# Patient Record
(demographics unavailable — no encounter records)

---

## 2025-01-27 NOTE — DISCUSSION/SUMMARY
[de-identified] : Discussed at length the natural history of degenerative arthritis and reviewed non-operative and operative treatment. Prior non-operative treatment for more than 3 months by either myself or prior treating physicians, including NSAIDs, injection therapy, a structured exercise program or physiotherapy and weight management has not resolved the condition. Due to the pain and associated functional disability that impacts all appropriate activities of daily living, I recommend a right total knee replacement. A thorough discussion regarding the risks, benefits, convalescence and alternatives were reviewed. The risks can include but are not limited to anesthesia risk, bleeding wit possible transfusion, nerve injury, infection, revision surgery due to implant failure, bone fracture, deep vein thrombosis, cardiac failure, pneumonia, and respiratory distress. The patient's expectations were reviewed and compared to the surgeon's expectations. This allowed for a discussion regarding reasonable expectations for functional improvement, pain relief, and return to normal activities of daily living. Numerous questions were asked and answered to their satisfaction. The patient was involved in the decision-making process - we discussed implant choice and fixation along with all details of TKA. Models were used as an educational tool. She does have a history of psoriasis. We are awaiting her lab results.  Preop medical and rheumatology clearance.   Surgery is scheduled for February 4, 2025.  Regarding her left TKR, she is doing well.

## 2025-01-27 NOTE — ADDENDUM
[FreeTextEntry1] : This note was written by Dav Beverly on 01/27/2025 acting as scribe for Dr. Anand العراقي I, Dr. Anand Fonseca, have read and attest that all the information, medical decision making and discharge instructions within are true and accurate.    This note was written by Amando Brothers on 01/27/2025 acting as scribe for Dr. Anand العراقي I, Dr. Anand Fonseca, have read and attest that all the information, medical decision making and discharge instructions within are true and accurate.

## 2025-01-27 NOTE — HISTORY OF PRESENT ILLNESS
[de-identified] : THAIS NEUMANN 67 year old female presents for follow-up evaluation of pre-op for an upcoming right TKR. She has been working on getting medical and rheumatology clearance. pt states that the knee continues to be painful and limited in activity at the gym. She has no complaints about left knee at this time.

## 2025-01-27 NOTE — PHYSICAL EXAM
[de-identified] : General appearance: well-nourished and hydrated, pleasant, alert and oriented x 3, cooperative. HEENT: Normocephalic, EOM intact, Nasal septum midline, Oral cavity clear, External auditory canal clear. Cardiovascular: no apparent abnormalities, no lower leg edema, no varicosities, pedal pulses are palpable. Lymphatics Lymph nodes: none palpated, Lymphedema: not present. Neurologic: sensation is normal, no muscle weakness in upper or lower extremities, patella tendon reflexes intact. Dermatologic no apparent skin lesions, moist, warm, no rash. Spine: cervical spine appears normal and moves freely, thoracic spine appears normal and moves freely, lumbosacral spine appears normal and moves freely. Gait: nonantalgic.   Left Knee Inspection: no effusion or erythema. Wounds: well-healed midline incision  Alignment: normal. Palpation: no specific tenderness on palpation. ROM: Active (in degrees): 0-120. Ligamentous laxity (neg): negative ant. drawer test, negative post. drawer test, stable to varus stress test, stable to valgus stress test, Patellofemoral Alignment Test: Q angle-, normal. Muscle Test: good quad strength. Leg examination: calf is soft and non-tender.   Right Knee Inspection: mild effusion. Wounds: none. Alignment: normal  Palpation: no specific tenderness on palpation. ROM: Active (in degrees): 0-90 with pain and crepitus Ligamentous laxity (neg): all ligaments appear stable, negative ant. drawer test, negative post. drawer test, stable to varus stress test, stable to valgus stress test, negative Lachman's test, negative pivot shift test, Meniscal Test: negative McMurrays, negative Peng. Patellofemoral Alignment Test: Q angle-, normal. Muscle Test: good quad strength. Leg examination: calf is soft and non-tender.   [de-identified] : Left knee x-ray, AP, lateral, merchant view taken at the office today demonstrates a total knee replacement in satisfactory position and alignment. No evidence of loosening. The patella sits in a central position.  Right knee x-rays, standing AP/Lateral and Merchant films, and 45-degree PA standing view, taken at the office today shows diffuse tricompartmental degenerative arthritis, normal alignment, marginal osteophytes, Joint space narrowing especially on the lateral compartment, bone on bone sclerosis, patella at appropriate height and central position, patellofemoral joint space narrowing, peripheral osteophytes, Kellgren and Sharad grade 4.

## 2025-02-26 NOTE — PROCEDURE
[de-identified] : Observation on incision dry, clean, intact, well healed. Method staple removing kit. Suture site Cleaned with iodine swab after sutures are completely removed. Instructions Keep incision dry and clean, allowed to shower and pat site dry, do not rub dry, contact office is site becomes red, swollen, infected, or you develop a fever.

## 2025-02-26 NOTE — HISTORY OF PRESENT ILLNESS
[Clean/Dry/Intact] : clean, dry and intact [Healed] : healed [Swelling] : swollen [Neuro Intact] : an unremarkable neurological exam [Vascular Intact] : ~T peripheral vascular exam normal [Negative Carloz's] : maneuvers demonstrated a negative Carloz's sign [Doing Well] : is doing well [No Sign of Infection] : is showing no signs of infection [Adequate Pain Control] : has adequate pain control [Staples Removed] : staples were removed [Chills] : no chills [Constipation] : no constipation [Diarrhea] : no diarrhea [Dysuria] : no dysuria [Fever] : no fever [Nausea] : no nausea [Vomiting] : no vomiting [Erythema] : not erythematous [Discharge] : absent of discharge [Dehiscence] : not dehisced [de-identified] : Patient presents today for F/U S/P Right TKR done 3 weeks ago. Patient is doing well and undergoing P.T. with improvement. Takes pain meds and DVT prophylaxis. I went over post-op care and answered all questions. I also provided patient with surgical report for their records. [de-identified] : ROM 0-100 degrees

## 2025-03-26 NOTE — DISCUSSION/SUMMARY
[de-identified] : Patient is doing very well, happy and making good progress following their s/p Right TKR. I reviewed x-rays with them and compared to prior films. I have reassured them that their implants are functioning well. All questions answered, understanding verbalized.   She is encouraged to continue to stay active with a home exercise program. They can continue activities as tolerated.     I will see them back in 6-8 weeks with x-rays of both knees.

## 2025-03-26 NOTE — ADDENDUM
[FreeTextEntry1] : This note was written by Charlee Hernandez on 03/26/2025 acting as scribe for Dr. Anand Fonseca M.D.   I, Dr. Anand Fonseca, have read and attest that all the information, medical decision making and discharge instructions within are true and accurate.

## 2025-03-26 NOTE — HISTORY OF PRESENT ILLNESS
[de-identified] : THAIS NEUMANN  67 year old female presents for evaluation of 7 weeks s/p left TKR. She attends PT 2x a week. Her therapist measured her at 120 degrees of flexion. Her only complaint is discomfort and stiffness with prolonged sitting which improves when she gets up and moves around. She is hoping to get back in the gym to do her own exercises. Pt will be seeing her rheumatologist in 2 weeks and will discuss resuming her Enbrel.

## 2025-03-26 NOTE — PHYSICAL EXAM
[de-identified] : General appearance: well-nourished and hydrated, pleasant, alert and oriented x 3, cooperative. HEENT: Normocephalic, EOM intact, Nasal septum midline, Oral cavity clear, External auditory canal clear. Cardiovascular: no apparent abnormalities, no lower leg edema, no varicosities, pedal pulses are palpable. Lymphatics Lymph nodes: none palpated, Lymphedema: not present. Neurologic: sensation is normal, no muscle weakness in upper or lower extremities, patella tendon reflexes intact. Dermatologic no apparent skin lesions, moist, warm, no rash. Spine: cervical spine appears normal and moves freely, thoracic spine appears normal and moves freely, lumbosacral spine appears normal and moves freely. Gait: nonantalgic.   Right Knee Inspection: no effusion Wounds: healed midline incision  Alignment: normal. Palpation: no specific tenderness on palpation. ROM: Active (in degrees): 0-120 with no instability Ligamentous laxity (neg): negative ant. drawer test, negative post. drawer test, stable to varus stress test, stable to valgus stress test, Patellofemoral Alignment Test: Q angle-, normal. Muscle Test: good quad strength. Leg examination: calf is soft and non-tender.  [de-identified] : Right knee x-ray, AP, lateral, merchant view taken at the office today demonstrates a total knee replacement in satisfactory position and alignment. No evidence of loosening. The patella sits in a central position.  Left knee x-ray merchant view taken at the office today demonstrates a total knee replacement with the patella in a central position.

## 2025-05-21 NOTE — PHYSICAL EXAM
[de-identified] : General appearance: well-nourished and hydrated, pleasant, alert and oriented x 3, cooperative. HEENT: Normocephalic, EOM intact, Nasal septum midline, Oral cavity clear, External auditory canal clear. Cardiovascular: no apparent abnormalities, no lower leg edema, no varicosities, pedal pulses are palpable. Lymphatics Lymph nodes: none palpated, Lymphedema: not present. Neurologic: sensation is normal, no muscle weakness in upper or lower extremities, patella tendon reflexes intact. Dermatologic no apparent skin lesions, moist, warm, no rash. Spine: cervical spine appears normal and moves freely, thoracic spine appears normal and moves freely, lumbosacral spine appears normal and moves freely. Gait: nonantalgic.   Right Knee Inspection: no effusion Wounds: healed midline incision  Alignment: normal. Palpation: no specific tenderness on palpation. ROM: Active (in degrees): 0-120 with no instability Ligamentous laxity (neg): negative ant. drawer test, negative post. drawer test, stable to varus stress test, stable to valgus stress test, Patellofemoral Alignment Test: Q angle-, normal. Muscle Test: good quad strength. Leg examination: calf is soft and non-tender.  Left Knee Inspection: no effusion Wounds: healed midline incision  Alignment: normal. Palpation: no specific tenderness on palpation. ROM: Active (in degrees): 0-120 with no instability Ligamentous laxity (neg): negative ant. drawer test, negative post. drawer test, stable to varus stress test, stable to valgus stress test, Patellofemoral Alignment Test: Q angle-, normal. Muscle Test: good quad strength. Leg examination: calf is soft and non-tender.  [de-identified] : Right knee x-ray, AP, lateral, merchant view taken at the office today demonstrates a total knee replacement in satisfactory position and alignment. No evidence of loosening. The patella sits in a central position. No change from prior films.  Left knee x-ray, AP, lateral, merchant view taken at the office today demonstrates a total knee replacement in satisfactory position and alignment. No evidence of loosening. The patella sits in a central position. No change from prior films.

## 2025-05-21 NOTE — DISCUSSION/SUMMARY
[de-identified] : Patient is doing very well and is very happy following their s/p Bilateral TKR with the right TKR now at 3 months. I reviewed x-rays with them and compared to prior films with no change from priors. I have reassured them that their implants are functioning well. All questions answered, understanding verbalized. KOOS Jr and PROMIS scores were obtained and reviewed.   She is encouraged to continue to stay active with a home exercise program and activities as tolerated.   I will see them back in 3 months with x-ray. I have referred her to Dr. Parikh for her back pain.

## 2025-05-21 NOTE — ADDENDUM
[FreeTextEntry1] : This note was written by Dav Beverly on 05/21/2025 acting as scribe for Dr. Anand Fonseca M.D.   I, Dr. Anand Fonseca, have read and attest that all the information, medical decision making and discharge instructions within are true and accurate.

## 2025-05-21 NOTE — HISTORY OF PRESENT ILLNESS
[de-identified] : THAIS NEUMANN  67 year old female presents for evaluation of 3 month s/p right TKR and 1 yr s/p left TKR both doing well. She has been doing her own home exercises including going to the gym. She states that since starting at the gym, she has been having intermittent back pain which she takes Celebrex for which she states helped. The pt states that she has not seen anyone for her back since childhood.